# Patient Record
Sex: MALE | Race: OTHER | NOT HISPANIC OR LATINO | Employment: FULL TIME | URBAN - METROPOLITAN AREA
[De-identification: names, ages, dates, MRNs, and addresses within clinical notes are randomized per-mention and may not be internally consistent; named-entity substitution may affect disease eponyms.]

---

## 2020-03-17 ENCOUNTER — HOSPITAL ENCOUNTER (EMERGENCY)
Facility: HOSPITAL | Age: 55
Discharge: HOME | End: 2020-03-17
Attending: EMERGENCY MEDICINE
Payer: COMMERCIAL

## 2020-03-17 VITALS
HEIGHT: 64 IN | OXYGEN SATURATION: 98 % | RESPIRATION RATE: 26 BRPM | DIASTOLIC BLOOD PRESSURE: 89 MMHG | TEMPERATURE: 98 F | SYSTOLIC BLOOD PRESSURE: 135 MMHG | WEIGHT: 165 LBS | BODY MASS INDEX: 28.17 KG/M2 | HEART RATE: 66 BPM

## 2020-03-17 DIAGNOSIS — R55 SYNCOPE AND COLLAPSE: Primary | ICD-10-CM

## 2020-03-17 DIAGNOSIS — R55 SYNCOPE: ICD-10-CM

## 2020-03-17 DIAGNOSIS — S00.81XA ABRASION OF FACE, INITIAL ENCOUNTER: ICD-10-CM

## 2020-03-17 LAB
ALBUMIN SERPL BCP-MCNC: 4 G/DL (ref 3.5–5.2)
ALP SERPL-CCNC: 51 U/L (ref 55–135)
ALT SERPL W/O P-5'-P-CCNC: 45 U/L (ref 10–44)
ANION GAP SERPL CALC-SCNC: 6 MMOL/L (ref 8–16)
AST SERPL-CCNC: 37 U/L (ref 10–40)
BASOPHILS # BLD AUTO: 0.02 K/UL (ref 0–0.2)
BASOPHILS NFR BLD: 0.3 % (ref 0–1.9)
BILIRUB SERPL-MCNC: 0.8 MG/DL (ref 0.1–1)
BUN SERPL-MCNC: 16 MG/DL (ref 6–20)
CALCIUM SERPL-MCNC: 9.2 MG/DL (ref 8.7–10.5)
CHLORIDE SERPL-SCNC: 103 MMOL/L (ref 95–110)
CO2 SERPL-SCNC: 29 MMOL/L (ref 23–29)
CREAT SERPL-MCNC: 0.9 MG/DL (ref 0.5–1.4)
DIFFERENTIAL METHOD: ABNORMAL
EOSINOPHIL # BLD AUTO: 0.2 K/UL (ref 0–0.5)
EOSINOPHIL NFR BLD: 3.2 % (ref 0–8)
ERYTHROCYTE [DISTWIDTH] IN BLOOD BY AUTOMATED COUNT: 12.5 % (ref 11.5–14.5)
EST. GFR  (AFRICAN AMERICAN): >60 ML/MIN/1.73 M^2
EST. GFR  (NON AFRICAN AMERICAN): >60 ML/MIN/1.73 M^2
GLUCOSE SERPL-MCNC: 102 MG/DL (ref 70–110)
HCT VFR BLD AUTO: 43.1 % (ref 40–54)
HGB BLD-MCNC: 14.6 G/DL (ref 14–18)
IMM GRANULOCYTES # BLD AUTO: 0.02 K/UL (ref 0–0.04)
IMM GRANULOCYTES NFR BLD AUTO: 0.3 % (ref 0–0.5)
LYMPHOCYTES # BLD AUTO: 1.7 K/UL (ref 1–4.8)
LYMPHOCYTES NFR BLD: 23.1 % (ref 18–48)
MCH RBC QN AUTO: 30.4 PG (ref 27–31)
MCHC RBC AUTO-ENTMCNC: 33.9 G/DL (ref 32–36)
MCV RBC AUTO: 90 FL (ref 82–98)
MONOCYTES # BLD AUTO: 0.5 K/UL (ref 0.3–1)
MONOCYTES NFR BLD: 7.4 % (ref 4–15)
NEUTROPHILS # BLD AUTO: 4.7 K/UL (ref 1.8–7.7)
NEUTROPHILS NFR BLD: 65.7 % (ref 38–73)
NRBC BLD-RTO: 0 /100 WBC
PLATELET # BLD AUTO: 244 K/UL (ref 150–350)
PMV BLD AUTO: 9 FL (ref 9.2–12.9)
POTASSIUM SERPL-SCNC: 3.9 MMOL/L (ref 3.5–5.1)
PROT SERPL-MCNC: 7.1 G/DL (ref 6–8.4)
RBC # BLD AUTO: 4.81 M/UL (ref 4.6–6.2)
SODIUM SERPL-SCNC: 138 MMOL/L (ref 136–145)
TROPONIN I SERPL DL<=0.01 NG/ML-MCNC: <0.03 NG/ML
TROPONIN I SERPL DL<=0.01 NG/ML-MCNC: <0.03 NG/ML
TSH SERPL DL<=0.005 MIU/L-ACNC: 2.29 UIU/ML (ref 0.34–5.6)
WBC # BLD AUTO: 7.14 K/UL (ref 3.9–12.7)

## 2020-03-17 PROCEDURE — 93005 ELECTROCARDIOGRAM TRACING: CPT | Performed by: INTERNAL MEDICINE

## 2020-03-17 PROCEDURE — 96360 HYDRATION IV INFUSION INIT: CPT

## 2020-03-17 PROCEDURE — 80053 COMPREHEN METABOLIC PANEL: CPT

## 2020-03-17 PROCEDURE — 63600175 PHARM REV CODE 636 W HCPCS: Performed by: EMERGENCY MEDICINE

## 2020-03-17 PROCEDURE — 85025 COMPLETE CBC W/AUTO DIFF WBC: CPT

## 2020-03-17 PROCEDURE — 99285 EMERGENCY DEPT VISIT HI MDM: CPT | Mod: 25

## 2020-03-17 PROCEDURE — 84443 ASSAY THYROID STIM HORMONE: CPT

## 2020-03-17 PROCEDURE — 84484 ASSAY OF TROPONIN QUANT: CPT | Mod: 91

## 2020-03-17 RX ORDER — SUCRALFATE 1 G/1
TABLET ORAL
COMMUNITY
Start: 2020-01-13 | End: 2020-03-17 | Stop reason: ALTCHOICE

## 2020-03-17 RX ORDER — PANTOPRAZOLE SODIUM 40 MG/1
TABLET, DELAYED RELEASE ORAL
COMMUNITY
Start: 2020-01-13 | End: 2020-03-17 | Stop reason: ALTCHOICE

## 2020-03-17 RX ADMIN — SODIUM CHLORIDE 1000 ML: 9 INJECTION, SOLUTION INTRAVENOUS at 11:03

## 2020-03-17 NOTE — DISCHARGE INSTRUCTIONS
Your cat scan today was normal of your head and neck  Labs were normal   No physical activity such as running or ellitical until you are cleared by cardiology.  You need a stress test.  Take a baby aspirin 81 mg every day.

## 2020-03-17 NOTE — ED PROVIDER NOTES
Encounter Date: 3/17/2020       History     Chief Complaint   Patient presents with    Loss of Consciousness     TODAY , AFTER WORKING OUT, NEG SCREEN    GEN WEAKNESS    Headache     Patient is a 54y  male no previous medical hx presenting with syncope.  This morning was working out on [employer] and felt weak 5 minutes into the workout.  He got off the machine and walked into kitchen where wife noted he was pale and eyes were wide.  He fell forward and wife attempted to catch him however he hit head on kitchen chair.  Complains of upper tooth pain.   He recalls feeling like he was going to pass out and feeling short of breath.  2 days ago patient he endorses intermittent frontal headache.  Denies change in appetite, bowel or bladder habits.  No known sick contacts.  Never had an episode of syncope in the past.          Review of patient's allergies indicates:  No Known Allergies  History reviewed. No pertinent past medical history.  No past surgical history on file.  No family history on file.  Social History     Tobacco Use    Smoking status: Not on file   Substance Use Topics    Alcohol use: Not on file    Drug use: Not on file     Review of Systems   Constitutional: Negative for chills and fever.   Respiratory: Negative for chest tightness.    Cardiovascular: Negative for chest pain.   Gastrointestinal: Negative for abdominal pain.   Genitourinary: Negative for difficulty urinating.   Musculoskeletal: Negative for back pain and neck pain.   Skin: Negative for rash.   Neurological: Positive for syncope, weakness and light-headedness.   All other systems reviewed and are negative.      Physical Exam     Initial Vitals [03/17/20 1057]   BP Pulse Resp Temp SpO2   (!) 151/94 73 18 97.9 °F (36.6 °C) 98 %      MAP       --         Physical Exam    Nursing note and vitals reviewed.  Constitutional: He appears well-developed and well-nourished.   HENT:   Head: Normocephalic and atraumatic.   Mouth/Throat:  Oropharynx is clear and moist.   Eyes: Pupils are equal, round, and reactive to light.   Neck: Normal range of motion. No JVD present.   Cardiovascular: Normal rate and intact distal pulses.   Pulmonary/Chest: Breath sounds normal. No stridor. No respiratory distress. He has no wheezes. He has no rhonchi. He has no rales. He exhibits no tenderness.   Abdominal: Soft. He exhibits no distension and no mass. There is no tenderness. There is no rebound and no guarding.   Musculoskeletal: Normal range of motion.   Neurological: He is alert and oriented to person, place, and time. He has normal strength. No cranial nerve deficit. GCS score is 15. GCS eye subscore is 4. GCS verbal subscore is 5. GCS motor subscore is 6.   Skin: Skin is warm. Capillary refill takes less than 2 seconds.         ED Course   Procedures  Labs Reviewed   COMPREHENSIVE METABOLIC PANEL - Abnormal; Notable for the following components:       Result Value    Alkaline Phosphatase 51 (*)     ALT 45 (*)     Anion Gap 6 (*)     All other components within normal limits   CBC W/ AUTO DIFFERENTIAL - Abnormal; Notable for the following components:    MPV 9.0 (*)     All other components within normal limits   TSH   TROPONIN I   TROPONIN I   TROPONIN I     EKG Readings: (Independently Interpreted)   Initial Reading: No STEMI. Rhythm: Normal Sinus Rhythm. Heart Rate: 60. Ectopy: No Ectopy. ST Segments: Normal ST Segments. Axis: Normal. Other Impression: QTc 412     ECG Results          EKG 12-lead (In process)  Result time 03/17/20 12:23:21    In process by Interface, Lab In [facility] (03/17/20 12:23:21)                 Narrative:    Test Reason : R55,    Vent. Rate : 075 BPM     Atrial Rate : 075 BPM     P-R Int : 162 ms          QRS Dur : 094 ms      QT Int : 380 ms       P-R-T Axes : 055 023 011 degrees     QTc Int : 424 ms    Normal sinus rhythm  Low voltage QRS  Borderline Abnormal ECG  No previous ECGs available    Referred By: [email or web address]   SELF            Confirmed By:                             Imaging Results          CT Cervical Spine Without Contrast (Final result)  Result time 03/17/20 11:41:55    Final result by Barney Hernández MD (03/17/20 11:41:55)                 Impression:      1. Normal CT cervical spine.  .      Electronically signed by: Barney Hernández MD  Date:    03/17/2020  Time:    11:41             Narrative:    EXAMINATION:  CT CERVICAL SPINE WITHOUT CONTRAST    CLINICAL HISTORY:  C-spine trauma, NEXUS/CCR negative, low risk;.    TECHNIQUE:  CMS MANDATED QUALITY DATA - CT RADIATION - 436    All CT scans at this facility utilize dose modulation, iterative reconstruction, and/or weight based dosing when appropriate to reduce radiation dose to as low as reasonably achievable.    Thin axial imaging was performed without contrast, with sagittal and coronal reformatted images reviewed.    COMPARISON:  None.    FINDINGS:  There is no CT evidence of cervical fracture or subluxation.  Prevertebral soft tissues are normal.  The odontoid is intact.                               CT Head Without Contrast (Final result)  Result time 03/17/20 11:35:01    Final result by Edith Hopper MD (03/17/20 11:35:01)                 Impression:      1. Normal CT appearance of the brain.      Electronically signed by: Edith Hopper MD  Date:    03/17/2020  Time:    11:35             Narrative:    EXAMINATION:  CT HEAD WITHOUT CONTRAST    CLINICAL HISTORY:  Syncope/fainting;.    TECHNIQUE:  CMS MANDATED QUALITY DATA - CT RADIATION - 436    All CT scans at this facility utilize dose modulation, iterative reconstruction, and/or weight based dosing when appropriate to reduce radiation dose to as low as reasonably achievable.    Non infusion images were obtained from the skull base to the vertex.    COMPARISON:  None.    FINDINGS:  There is no evidence of intracranial mass, hemorrhage, or midline shift.  The ventricles and sulci are within normal limits.  There  are no pathologic extra-axial fluid collections.    There is no evidence of ischemic change or edema.  Cerebellum and brainstem are unremarkable.    The calvarium is intact.                               X-Ray Chest AP Portable (Final result)  Result time 03/17/20 11:32:42    Final result by Edith Hopper MD (03/17/20 11:32:42)                 Impression:      No acute cardiopulmonary abnormality.      Electronically signed by: Edith Hopper MD  Date:    03/17/2020  Time:    11:32             Narrative:    EXAMINATION:  XR CHEST AP PORTABLE    CLINICAL HISTORY:  Syncope and collapse    FINDINGS:  Portable chest at 11:33 without comparisons shows normal cardiomediastinal silhouette.    Lungs are clear. Pulmonary vasculature is normal. No acute osseous abnormality.                                 Medical Decision Making:   ED Management:  [place] Syncope Score 0    CHF Hx  Hct <30 %  EKG abnormalities  Shortness of breath symptoms  Systolic BP < 90 at triage    Patient is in the low risk group for serious outcome    This is an emergent evaluation of a 54-year-old  male presenting with complaint of syncope after working out.  Physical exam remarkable for nontoxic afebrile hypertensive man with no focal neurologic deficits an abrasion to the philtrum.  Labs remarkable for no leukocytosis, no anemia, no electrolyte abnormalities.  CT head and C-spine without focal findings.  EKG without ischemia.  Delta Troponin negative. Patient discharged with cardiology referral.                                   Clinical Impression:       ICD-10-CM ICD-9-CM   1. Syncope and collapse R55 780.2   2. Syncope R55 780.2   3. Abrasion of face, initial encounter S00.81XA 910.0             ED Disposition Condition    Discharge Stable        ED Prescriptions     None        Follow-up Information     Follow up With Specialties Details Why Contact Info    Phillip Zacarias MD Cardiology Schedule an appointment as soon as  possible for a visit in 3 days For a stress test [address]  [place] [facility]  [facility] [id number]  [phone]                                       Rea Rangel MD  03/17/20 1443       Rea Rangel MD  03/17/20 3902